# Patient Record
Sex: FEMALE | Race: WHITE | NOT HISPANIC OR LATINO | Employment: FULL TIME | URBAN - METROPOLITAN AREA
[De-identification: names, ages, dates, MRNs, and addresses within clinical notes are randomized per-mention and may not be internally consistent; named-entity substitution may affect disease eponyms.]

---

## 2023-11-27 PROBLEM — J30.9 ALLERGIC RHINITIS: Status: ACTIVE | Noted: 2023-02-10

## 2023-11-27 PROBLEM — K59.00 CONSTIPATION: Status: ACTIVE | Noted: 2023-02-10

## 2023-11-27 PROBLEM — M26.69 INFLAMMATION OF TEMPOROMANDIBULAR JOINT: Status: ACTIVE | Noted: 2023-02-10

## 2023-11-27 PROBLEM — H61.22 EXCESSIVE CERUMEN IN EAR CANAL, LEFT: Status: ACTIVE | Noted: 2023-11-27

## 2023-11-27 PROBLEM — N95.1 MENOPAUSAL SYNDROME: Status: ACTIVE | Noted: 2023-02-10

## 2023-11-27 PROBLEM — K11.7 XEROSTOMIA: Status: ACTIVE | Noted: 2023-11-27

## 2023-11-27 PROBLEM — M79.89 CALF SWELLING: Status: ACTIVE | Noted: 2023-02-10

## 2023-11-27 PROBLEM — M54.2 NECK PAIN: Status: ACTIVE | Noted: 2023-11-27

## 2023-11-27 PROBLEM — E07.9 DISORDER OF THYROID: Status: ACTIVE | Noted: 2023-11-27

## 2023-11-27 PROBLEM — E03.9 ACQUIRED HYPOTHYROIDISM: Status: ACTIVE | Noted: 2023-02-10

## 2023-11-27 PROBLEM — I70.0 ARTERIOSCLEROSIS OF AORTA (CMS-HCC): Status: ACTIVE | Noted: 2023-02-10

## 2023-11-27 PROBLEM — N39.46 MIXED INCONTINENCE: Status: ACTIVE | Noted: 2020-07-06

## 2023-11-27 PROBLEM — F41.1 GENERALIZED ANXIETY DISORDER: Status: ACTIVE | Noted: 2023-02-10

## 2023-11-27 PROBLEM — K21.9 GASTRO-ESOPHAGEAL REFLUX DISEASE WITHOUT ESOPHAGITIS: Status: ACTIVE | Noted: 2023-02-10

## 2023-11-27 PROBLEM — J02.9 SORETHROAT: Status: ACTIVE | Noted: 2023-11-27

## 2023-11-27 PROBLEM — E78.00 HYPERCHOLESTEROLEMIA: Status: ACTIVE | Noted: 2023-02-10

## 2023-11-27 PROBLEM — I10 BENIGN HYPERTENSION: Status: ACTIVE | Noted: 2023-02-10

## 2023-11-27 PROBLEM — K31.9 STOMACH PROBLEMS: Status: ACTIVE | Noted: 2023-11-27

## 2023-11-27 PROBLEM — R15.9 FULL INCONTINENCE OF FECES: Status: ACTIVE | Noted: 2020-07-06

## 2023-11-27 PROBLEM — M47.812 CERVICAL ARTHRITIS: Status: ACTIVE | Noted: 2023-02-10

## 2023-11-27 PROBLEM — M81.0 OSTEOPOROSIS: Status: ACTIVE | Noted: 2023-02-10

## 2023-11-27 PROBLEM — J02.9 SORE THROAT: Status: ACTIVE | Noted: 2023-11-27

## 2023-11-27 PROBLEM — R47.02 DYSPHASIA: Status: ACTIVE | Noted: 2023-11-27

## 2023-11-27 PROBLEM — M17.9 OSTEOARTHRITIS OF KNEE: Status: ACTIVE | Noted: 2023-02-10

## 2023-11-27 PROBLEM — H90.3 SENSORINEURAL HEARING LOSS (SNHL) OF BOTH EARS: Status: ACTIVE | Noted: 2023-02-10

## 2023-11-27 PROBLEM — Z86.79 HISTORY OF HYPERTENSION: Status: ACTIVE | Noted: 2023-11-27

## 2023-11-27 PROBLEM — M85.80 OSTEOPENIA: Status: ACTIVE | Noted: 2023-02-10

## 2023-11-27 RX ORDER — PRAVASTATIN SODIUM 80 MG/1
TABLET ORAL
COMMUNITY
Start: 2023-02-08 | End: 2023-12-05 | Stop reason: ALTCHOICE

## 2023-11-27 RX ORDER — LANOLIN ALCOHOL/MO/W.PET/CERES
CREAM (GRAM) TOPICAL 2 TIMES DAILY
COMMUNITY

## 2023-11-27 RX ORDER — AMLODIPINE BESYLATE 5 MG/1
5 TABLET ORAL
COMMUNITY

## 2023-11-27 RX ORDER — LUTEIN 6 MG
TABLET ORAL
COMMUNITY
Start: 2021-05-21 | End: 2023-12-05 | Stop reason: ALTCHOICE

## 2023-11-27 RX ORDER — IBANDRONATE SODIUM 150 MG/1
150 TABLET, FILM COATED ORAL
COMMUNITY
Start: 2021-05-20 | End: 2023-12-05 | Stop reason: ALTCHOICE

## 2023-11-27 RX ORDER — LEVOTHYROXINE SODIUM 50 UG/1
TABLET ORAL
COMMUNITY
Start: 2015-06-17 | End: 2023-12-05 | Stop reason: ALTCHOICE

## 2023-11-27 RX ORDER — PANTOPRAZOLE SODIUM 40 MG/1
TABLET, DELAYED RELEASE ORAL
COMMUNITY
Start: 2015-06-22 | End: 2023-12-05 | Stop reason: ALTCHOICE

## 2023-11-27 RX ORDER — PREGABALIN 50 MG/1
50 CAPSULE ORAL 3 TIMES DAILY
COMMUNITY
End: 2023-12-05 | Stop reason: ALTCHOICE

## 2023-11-27 RX ORDER — HYDROCODONE BITARTRATE AND ACETAMINOPHEN 5; 325 MG/1; MG/1
TABLET ORAL
COMMUNITY
Start: 2021-06-14 | End: 2023-12-05 | Stop reason: ALTCHOICE

## 2023-11-27 RX ORDER — FAMOTIDINE 40 MG/1
TABLET, FILM COATED ORAL
COMMUNITY
Start: 2018-11-30 | End: 2023-12-05 | Stop reason: ALTCHOICE

## 2023-11-27 RX ORDER — UBIDECARENONE 30 MG
100 CAPSULE ORAL
COMMUNITY
End: 2023-12-05 | Stop reason: ALTCHOICE

## 2023-11-27 RX ORDER — LORAZEPAM 1 MG/1
TABLET ORAL
COMMUNITY
Start: 2023-01-19 | End: 2023-12-05 | Stop reason: ALTCHOICE

## 2023-11-27 RX ORDER — ONDANSETRON 4 MG/1
1 TABLET, ORALLY DISINTEGRATING ORAL EVERY 8 HOURS PRN
COMMUNITY
Start: 2021-07-01 | End: 2023-12-05 | Stop reason: ALTCHOICE

## 2023-11-27 RX ORDER — CLOBETASOL PROPIONATE 0.5 MG/G
CREAM TOPICAL 2 TIMES DAILY
COMMUNITY
Start: 2020-06-25 | End: 2023-12-05 | Stop reason: ALTCHOICE

## 2023-11-27 RX ORDER — ESTRADIOL 0.1 MG/G
CREAM VAGINAL
COMMUNITY
Start: 2018-11-30 | End: 2023-12-05 | Stop reason: ALTCHOICE

## 2023-11-27 RX ORDER — ONDANSETRON 4 MG/1
4 TABLET, FILM COATED ORAL
COMMUNITY
Start: 2022-01-12 | End: 2023-12-05 | Stop reason: ALTCHOICE

## 2023-11-27 RX ORDER — DICYCLOMINE HYDROCHLORIDE 10 MG/1
10 CAPSULE ORAL 4 TIMES DAILY
COMMUNITY
Start: 2015-07-31 | End: 2023-12-05 | Stop reason: ALTCHOICE

## 2023-11-27 RX ORDER — ASPIRIN 81 MG/1
81 TABLET ORAL
COMMUNITY
Start: 2022-01-12

## 2023-11-27 RX ORDER — OMEPRAZOLE 40 MG/1
CAPSULE, DELAYED RELEASE ORAL
COMMUNITY
Start: 2021-07-28 | End: 2023-12-05 | Stop reason: ALTCHOICE

## 2023-11-27 RX ORDER — DOCUSATE SODIUM 100 MG/1
CAPSULE, LIQUID FILLED ORAL
COMMUNITY
Start: 2021-07-01 | End: 2023-12-05 | Stop reason: ALTCHOICE

## 2023-11-27 RX ORDER — METOCLOPRAMIDE 5 MG/1
5 TABLET ORAL
COMMUNITY
End: 2023-12-05 | Stop reason: ALTCHOICE

## 2023-11-27 RX ORDER — MULTIVIT-MIN/IRON/FOLIC ACID/K 18-600-40
CAPSULE ORAL
COMMUNITY
Start: 2016-10-12 | End: 2023-12-05 | Stop reason: ALTCHOICE

## 2023-11-27 RX ORDER — BIOTIN 1 MG
TABLET ORAL
COMMUNITY
Start: 2016-10-12 | End: 2023-12-05 | Stop reason: ALTCHOICE

## 2023-11-27 RX ORDER — HYDROCORTISONE 25 MG/G
CREAM TOPICAL 2 TIMES DAILY
COMMUNITY
Start: 2019-05-06 | End: 2023-12-05 | Stop reason: ALTCHOICE

## 2023-12-03 NOTE — PROGRESS NOTES
Subjective   Patient ID: Sangita Chau is a 78 y.o. female who presents for Sinusitis (She has done 2 zpaks and doxycyline. Started in July.  ).  HPI  This 78-year-old female is being seen back in the office for recheck of her upper aerodigestive tract.  There have been symptoms of coughing and difficulties with swallowing.  She was seen last back in 2021 at that time she did have some difficulties with swallowing noted.  She had a modified barium swallow done which was normal in all phases no obstruction was noted.  Her complaint of dysphagia at that time was unexplainable on the basis of an ENT exam and she had been suggested to see GI for their opinion.  She does have an irritable cough which was noted back in 2021 as well..  She had no specific findings in regards to her nasal sinus area on physical examination and at that point she had not been seen by pulmonology.  Saline nasal rinses had been advised along with a topical steroid spray.She also has a history of sensorineural hearing loss asymmetric for which an MRI scan has been obtained a number of years ago.  In 2021 on the left-hand side there was a mild to moderate  hearing loss through the speech frequencies moderately severe in the upper frequencies of sound with 100% discrimination ability.  And on the right-hand side there was still basically low normal hearing up through 2000 Hz then a mild to moderate mostly high-frequency hearing loss was noted with 100% discrimination ability.  Reviewing her past office visits she has often been seen since 2005 by myself and previously by another provider for symptoms of sore throat, hoarseness, complaints regarding swallowing.  Reflux has been suggested on past evaluations.  Recently she been having difficulties with symptoms of sinusitis and she states has been on 3 antibiotics to rounds of Zithromycin and 110 days course of doxycycline.  She is experiencing a strong odor has been having some discolored nasal  discharge which she feels is bilateral.  There is been no fever, facial swelling.  She has had some throat clearing but her coughing she states has not been significant.  She does have a strong history of reflux.    Review of Systems   HENT:  Positive for hearing loss.    Respiratory:  Positive for cough.    Gastrointestinal:         Complaints of dysphagia   Neurological:  Negative for dizziness and headaches.   All other systems reviewed and are negative.      Objective   Physical Exam  EXAMINATION:    GENERAL SHERRY.EARANCE: Alert, in no acute distress, normal pitch and clarity of voice, well-developed and nourished, cooperative.    HEAD/FACE: Normocephalic, atraumatic, normal facial movements and strength, no no tenderness to palpation, no lesions noted.    SKIN: Normal turgor, no raised or ulcerative lesions, warm and dry to palpation.    EYES: Extraocular motions intact, no nystagmus noted, pupils equal and reactive to light and accommodation, no conjunctivitis.    EARS: Both ears--external ear anatomy is normal without lesions, auditory canals are patent and without skin abrasions or lesions, hearing is intact to voice, tympanic membranes are intact with no acute inflammation, light reflexes present, no effusions are noted and no mastoid tenderness found to palpation.    NOSE: No external skin lesions are noted, nares are patent, septum is intact, sinuses are nontender to palpation bilaterally, no intranasal lesions or inflammation is noted, nasal valve is normal.    OROPHARYNX/ORAL CAVITY: Oropharynx is not inflamed and is without lesions, mucosa of the oral cavity is intact and without lesions, tongue is midline and mobile, no acute dental disease is noted, TMJs are mobile    LUNG-- NO wheezing or rhonchi normal respiratory effort    HEART-- No venous congestion,  rate and rhythm regular,    NECK: No lymphadenopathy is palpated, carotid pulses are intact, neck is supple with full range of motion, no thyroid  abnormalities are noted, trachea is midline, no neck masses are palpated.    LYMPHATICS: No cervical adenopathy or supraclavicular adenopathy is palpated.    NEUROLOGIC/PSYCH; alert and oriented, cranial nerves are grossly intact, gait is without falling, no motor deficits are noted.    Patient ID: Sangita Chau is a 78 y.o. female.    Laryngoscopy    Date/Time: 12/5/2023 10:54 AM    Performed by: Alec Moreno DMD, MD  Authorized by: Alec Moreno DMD, MD    Consent:     Consent obtained:  Verbal    Consent given by:  Patient    Risks discussed:  Pain    Alternatives discussed:  No treatment and referral  Comments:      LARYNGOSCOPY    Indications:      Consent:  The procedure was discussed including the possible risks and benefits and alternative treatments were discussed with verbal consent obtained.    Procedure:  Topical Pierce-Synephrine and lidocaine is applied as a decongestant and anesthetic nasally. A fiberoptic laryngoscope is inserted nasally and the upper aerodigestive tract is examined.    Findings: I intranasally on the right-hand side there was a septal spur noted with close approximation to the inferior turbinate.  In the middle meatus there appeared to be no signs of purulent discharge there was a slightly wider maxillary ostia.  No swelling or inflammation was noted.  On the left-hand side there was no septal deviation but there was purulent discharge in the middle meatus tracking posteriorly into the nasopharynx.  Culture was taken of this.  The nasopharynx itself was negative for swelling or inflammation.  Nasal polyps or significant obstructions from the septum or turbinates. The nasopharynx was normal showing no signs of abnormalities to the mucous membranes and there was no obstruction to the eustachian tubes. Base of the tongue and vallecula were within normal limits as was the supraglottic larynx, lateral walls of the oropharynx, and the hypopharyngeal mucosa.  The endolarynx  revealed normal vocal cord movements which were symmetric and no lesions were noted. There were no secretions pooling in the post cricoid region.    Post procedure: The patient tolerated the procedure well without complications.    Assessment/Plan   Problem List Items Addressed This Visit             ICD-10-CM    Cervical arthritis M47.812    Dysphasia R47.02    Gastro-esophageal reflux disease without esophagitis K21.9    Sensorineural hearing loss (SNHL) of both ears - Primary H90.3    Xerostomia K11.7     Other Visit Diagnoses         Codes    Chronic cough     R05.3    Chronic maxillary sinusitis     J32.0    Relevant Orders    Tissue/Wound Culture/Smear          I discussed the clinical finds with the patient.  Her exam today does show evidence for purulent nasal discharge left-sided which would suggest a possible infection in the maxillary ethmoid sinus complex.  This could account for the smell that she notes.  Culture was taken of this and she will be placed on Omnicef for 10 days while a CT scan of the sinuses is obtained.  I did recommend that she use a saline nasal rinse morning and night using distilled water to mix the saline and a sinus rinse kit.  She should use a topical nasal steroid spray as well.  She needs to be seen after completing the medication and the CT scan to see what degree of infection she has.  Any complications from the medication she should contact the office.  Her penicillin allergy is very distant during childhood and the cross reaction with Omnicef should be minimal if present at all.  She is told however that if she has any swelling she should contact the office and stop the medication.

## 2023-12-05 ENCOUNTER — OFFICE VISIT (OUTPATIENT)
Dept: OTOLARYNGOLOGY | Facility: CLINIC | Age: 78
End: 2023-12-05
Payer: MEDICARE

## 2023-12-05 VITALS — HEIGHT: 64 IN | WEIGHT: 280 LBS | BODY MASS INDEX: 47.8 KG/M2

## 2023-12-05 DIAGNOSIS — M47.812 CERVICAL ARTHRITIS: ICD-10-CM

## 2023-12-05 DIAGNOSIS — M26.609 TEMPOROMANDIBULAR JOINT DISORDER: ICD-10-CM

## 2023-12-05 DIAGNOSIS — H90.3 SENSORINEURAL HEARING LOSS (SNHL) OF BOTH EARS: ICD-10-CM

## 2023-12-05 DIAGNOSIS — H61.23 BILATERAL IMPACTED CERUMEN: ICD-10-CM

## 2023-12-05 DIAGNOSIS — J32.0 CHRONIC MAXILLARY SINUSITIS: Primary | ICD-10-CM

## 2023-12-05 PROCEDURE — 99214 OFFICE O/P EST MOD 30 MIN: CPT | Performed by: OTOLARYNGOLOGY

## 2023-12-05 RX ORDER — ACETAMINOPHEN 500 MG
TABLET ORAL DAILY
COMMUNITY

## 2023-12-05 RX ORDER — CEFDINIR 300 MG/1
300 CAPSULE ORAL 2 TIMES DAILY
Qty: 20 CAPSULE | Refills: 0 | Status: SHIPPED | OUTPATIENT
Start: 2023-12-05 | End: 2023-12-05 | Stop reason: ENTERED-IN-ERROR

## 2023-12-05 RX ORDER — LOSARTAN POTASSIUM 25 MG/1
25 TABLET ORAL DAILY
COMMUNITY

## 2023-12-05 RX ORDER — FENOFIBRATE 160 MG/1
160 TABLET ORAL DAILY
COMMUNITY

## 2023-12-05 RX ORDER — VITAMIN E MIXED 400 UNIT
400 CAPSULE ORAL DAILY
COMMUNITY

## 2023-12-05 RX ORDER — METOPROLOL TARTRATE 50 MG/1
TABLET ORAL
COMMUNITY

## 2023-12-05 RX ORDER — GLIMEPIRIDE 4 MG/1
4 TABLET ORAL
COMMUNITY

## 2023-12-05 RX ORDER — BISMUTH SUBSALICYLATE 262 MG
1 TABLET,CHEWABLE ORAL DAILY
COMMUNITY

## 2023-12-05 RX ORDER — MONTELUKAST SODIUM 10 MG/1
10 TABLET ORAL NIGHTLY
COMMUNITY

## 2023-12-05 RX ORDER — CYCLOBENZAPRINE HCL 5 MG
TABLET ORAL
COMMUNITY

## 2023-12-05 RX ORDER — SIMVASTATIN 20 MG/1
20 TABLET, FILM COATED ORAL NIGHTLY
COMMUNITY

## 2023-12-05 RX ORDER — METFORMIN HYDROCHLORIDE 500 MG/1
500 TABLET ORAL
COMMUNITY

## 2023-12-05 RX ORDER — VIT C/E/ZN/COPPR/LUTEIN/ZEAXAN 250MG-90MG
CAPSULE ORAL
COMMUNITY

## 2023-12-05 RX ORDER — FERROUS SULFATE 325(65) MG
325 TABLET ORAL
COMMUNITY

## 2023-12-05 ASSESSMENT — ENCOUNTER SYMPTOMS
COUGH: 1
DIZZINESS: 0
HEADACHES: 0

## 2023-12-05 NOTE — ADDENDUM NOTE
Addended by: DANITA GARLAND on: 12/5/2023 12:40 PM     Modules accepted: Orders, Level of Service

## 2023-12-05 NOTE — PROGRESS NOTES
Subjective   Patient ID: Sangita Chau is a 78 y.o. female who presents for Sinusitis (She has done 2 zpaks and doxycyline. Started in July.  ).  Sinusitis      This 78-year-old female is being seen in the office today for an evaluation into right ear pain with distributions around the temple area preauricular area and upper neck that developed over the last few days.  She was seen in urgent care center where they told her this was all due to wax impaction.  She has had difficulties in the past with coughing secondary to reflux.  She did have a swallowing assessment done which was normal in the past.  She has not seen the GI providers to assess this further.  She states after using the wax removal material her pains subsided.      Objective   Physical Exam  EXAMINATION:    GENERAL SHERRY.EARANCE: Alert, in no acute distress, normal pitch and clarity of voice, well-developed and nourished, cooperative.    HEAD/FACE: Normocephalic, atraumatic, normal facial movements and strength, no no tenderness to palpation, no lesions noted.    SKIN: Normal turgor, no raised or ulcerative lesions, warm and dry to palpation.    EYES: Extraocular motions intact, no nystagmus noted, pupils equal and reactive to light and accommodation, no conjunctivitis.    EARS: Both ears--external ear anatomy is normal without lesions, cerumen is buildup is noted bilaterally and is removed, auditory canals are patent and without skin abrasions or lesions, hearing is intact to voice, tympanic membranes are intact with no acute inflammation, light reflexes present, no effusions are noted and no mastoid tenderness found to palpation.    NOSE: No external skin lesions are noted, nares are patent, septum is intact, sinuses are nontender to palpation bilaterally, no intranasal lesions or inflammation is noted, nasal valve is normal.    OROPHARYNX/ORAL CAVITY: Oropharynx is not inflamed and is without lesions, mucosa of the oral cavity is intact and  without lesions, tongue is midline and mobile, edentulous upper arch, mucous membrane underneath the plate was normal, missing posterior mandibular teeth is noted with retained anterior teeth.  Positive TMJ pain over the right side no acute inflammation is noted.  TMJs are mobile    LARYNX--ear examination revealed a symmetric and normal tongue base, no pharyngeal or hypopharyngeal wall inflammation was noted, the larynx was normal with normal vocal cord movement and no mucous entrapment was noted in the postcricoid area.  The patient was able to hold her gag reflex well for a complete mirror exam.    LUNG-- NO wheezing or rhonchi normal respiratory effort    HEART-- No venous congestion,  rate and rhythm regular,    NECK: No lymphadenopathy is palpated, carotid pulses are intact, neck is supple with full range of motion, no thyroid abnormalities are noted, trachea is midline, no neck masses are palpated.    LYMPHATICS: No cervical adenopathy or supraclavicular adenopathy is palpated.    NEUROLOGIC/PSYCH; alert and oriented, cranial nerves are grossly intact, gait is without falling, no motor deficits are noted.     Patient ID: Sangita Chau is a 78 y.o. female.    Ear cerumen removal    Date/Time: 12/5/2023 12:35 PM    Performed by: Alec Moreno DMD, MD  Authorized by: Alec Moreno DMD, MD    Consent:     Consent obtained:  Verbal    Consent given by:  Patient    Risks, benefits, and alternatives were discussed: yes      Risks discussed:  Incomplete removal, pain, infection, dizziness and bleeding    Alternatives discussed:  Observation  Comments:        Procedure Ear Cleaning    Consent:  The planned procedure including the risks as well as alternatives of treatments were discussed and verbal consent obtained.    Procedure: Using otoscopic techniques, cerumen is removed with a wax loop from both ear canals.    Findings:  The external auditory canals are without inflammation or lesions and both  tympanic members are normal in appearance with no evidence for middle ear effusion or signs of infection. No mastoid tenderness is noted. The patient tolerated the procedure well.   Assessment/Plan   Problem List Items Addressed This Visit             ICD-10-CM    Cervical arthritis M47.812    Sensorineural hearing loss (SNHL) of both ears H90.3     Other Visit Diagnoses         Codes    Chronic maxillary sinusitis    -  Primary J32.0    Relevant Orders    Tissue/Wound Culture/Smear    Temporomandibular joint disorder     M26.609          I discussed the findings with the patient. Do to the history of cerumen impactions, avoidance of Q tip use and water contamination is advised. Periodic check ups in the office or with the PCP are advised and if recurrent obstructions are noted a scheduled cleaning schedule will be maintained. Ear pain, otorhea, changes in hearing should be reported to the office. For some the use of debrox or baby oil can be helpful as long as susie TM is intact and not perforated.     I discussed the findings with the patient. TMJ irritation is often associated with symptoms of ear pain or pressure and can cause regional pain in the head and neck. Temporal headaches, facail pain, and upper neck pain are often reported. Clicking and crepitance with movement of the joint are often noted subjectively or on exam. Dental follow up for assessments of bite abnormalities especially in the face of denture use is advised. Unusual pain or symptoms can be assessed further by MRI scanning as needed.  Did suggest that she use Voltaren over the TMJ complex on the right 2-3 times daily to help minimize discomfort and inflammation.    I did suggest that she be seen in the future for a recheck of her ears and hearing since the last audiogram was in 2021.  Her examination today of her hypopharynx was negative based on mirror examination with no signs of acute inflammation of the larynx or hypopharyngeal mucosa.